# Patient Record
Sex: FEMALE | Race: WHITE | URBAN - METROPOLITAN AREA
[De-identification: names, ages, dates, MRNs, and addresses within clinical notes are randomized per-mention and may not be internally consistent; named-entity substitution may affect disease eponyms.]

---

## 2019-07-03 ENCOUNTER — NEW PATIENT (OUTPATIENT)
Dept: URBAN - METROPOLITAN AREA CLINIC 30 | Facility: CLINIC | Age: 84
End: 2019-07-03
Payer: MEDICARE

## 2019-07-03 DIAGNOSIS — Z98.890 OTHER SPECIFIED POSTPROCEDURAL STATES: ICD-10-CM

## 2019-07-03 DIAGNOSIS — H35.371 PUCKERING OF MACULA, RIGHT EYE: ICD-10-CM

## 2019-07-03 DIAGNOSIS — H16.223 KERATOCONJUNCTIVITIS SICCA, BILATERAL: ICD-10-CM

## 2019-07-03 DIAGNOSIS — H25.813 COMBINED FORMS OF AGE-RELATED CATARACT, BILATERAL: ICD-10-CM

## 2019-07-03 DIAGNOSIS — G51.0 BELL'S PALSY: Primary | ICD-10-CM

## 2019-07-03 PROCEDURE — 92004 COMPRE OPH EXAM NEW PT 1/>: CPT | Performed by: OPTOMETRIST

## 2019-07-03 PROCEDURE — 83861 MICROFLUID ANALY TEARS: CPT | Performed by: OPTOMETRIST

## 2019-07-03 PROCEDURE — 92134 CPTRZ OPH DX IMG PST SGM RTA: CPT | Performed by: OPTOMETRIST

## 2019-07-03 ASSESSMENT — INTRAOCULAR PRESSURE
OD: 11
OS: 10

## 2019-07-03 ASSESSMENT — VISUAL ACUITY
OD: 20/50
OS: 20/40

## 2019-07-03 ASSESSMENT — KERATOMETRY
OS: 42.30
OD: 23.04

## 2019-08-14 ENCOUNTER — FOLLOW UP ESTABLISHED (OUTPATIENT)
Dept: URBAN - METROPOLITAN AREA CLINIC 30 | Facility: CLINIC | Age: 84
End: 2019-08-14
Payer: MEDICARE

## 2019-08-14 PROCEDURE — 99213 OFFICE O/P EST LOW 20 MIN: CPT | Performed by: OPTOMETRIST

## 2019-08-14 PROCEDURE — 92015 DETERMINE REFRACTIVE STATE: CPT | Performed by: OPTOMETRIST

## 2019-08-14 ASSESSMENT — INTRAOCULAR PRESSURE
OD: 11
OS: 11

## 2020-08-26 ENCOUNTER — NEW PATIENT (OUTPATIENT)
Dept: URBAN - METROPOLITAN AREA CLINIC 24 | Facility: CLINIC | Age: 85
End: 2020-08-26
Payer: MEDICARE

## 2020-08-26 DIAGNOSIS — H16.143 PUNCTATE KERATITIS, BILATERAL: Primary | ICD-10-CM

## 2020-08-26 DIAGNOSIS — H25.813 COMBINED FORMS OF AGE-RELATED CATARACT, BILATERAL: ICD-10-CM

## 2020-08-26 DIAGNOSIS — H52.4 PRESBYOPIA: ICD-10-CM

## 2020-08-26 DIAGNOSIS — H33.311 HORSESHOE TEAR OF RETINA WITHOUT DETACHMENT, RIGHT EYE: ICD-10-CM

## 2020-08-26 PROCEDURE — 92004 COMPRE OPH EXAM NEW PT 1/>: CPT | Performed by: OPTOMETRIST

## 2020-08-26 ASSESSMENT — VISUAL ACUITY
OS: 20/30
OD: 20/30

## 2020-08-26 ASSESSMENT — INTRAOCULAR PRESSURE
OS: 12
OD: 11

## 2020-08-26 ASSESSMENT — KERATOMETRY
OD: 43.06
OS: 42.70

## 2022-12-12 ENCOUNTER — OFFICE VISIT (OUTPATIENT)
Dept: URBAN - METROPOLITAN AREA CLINIC 24 | Facility: CLINIC | Age: 87
End: 2022-12-12
Payer: MEDICARE

## 2022-12-12 DIAGNOSIS — H16.143 PUNCTATE KERATITIS, BILATERAL: ICD-10-CM

## 2022-12-12 DIAGNOSIS — H33.311 HORSESHOE TEAR OF RETINA WITHOUT DETACHMENT, RIGHT EYE: ICD-10-CM

## 2022-12-12 DIAGNOSIS — H25.813 COMBINED FORMS OF AGE-RELATED CATARACT, BILATERAL: Primary | ICD-10-CM

## 2022-12-12 DIAGNOSIS — G51.0 BELL'S PALSY: ICD-10-CM

## 2022-12-12 DIAGNOSIS — H00.11 CHALAZION RIGHT UPPER LID: ICD-10-CM

## 2022-12-12 PROCEDURE — 92250 FUNDUS PHOTOGRAPHY W/I&R: CPT | Performed by: OPTOMETRIST

## 2022-12-12 PROCEDURE — 92134 CPTRZ OPH DX IMG PST SGM RTA: CPT | Performed by: OPTOMETRIST

## 2022-12-12 PROCEDURE — 99214 OFFICE O/P EST MOD 30 MIN: CPT | Performed by: OPTOMETRIST

## 2022-12-12 ASSESSMENT — VISUAL ACUITY
OD: 20/50
OS: 20/30

## 2022-12-12 ASSESSMENT — INTRAOCULAR PRESSURE
OS: 12
OD: 12

## 2022-12-12 NOTE — IMPRESSION/PLAN
Impression: Bell's palsy: G51.0. Plan: Longstanding, s/p gold weight UL. Incomplete lid closure with 3+ PEK. Recommend gel/letitia tears QID+ May tape lids closed at night. Discussed diagnosis in will limit BCVA s/p cat sx. [place] for pre-op.

## 2022-12-12 NOTE — IMPRESSION/PLAN
Impression: Chalazion right upper lid: H00.11. Plan: Improving. Continue warm compresses BID/lid hygiene OU. Discussed option of referral for removal, pt defers at this time.

## 2022-12-12 NOTE — IMPRESSION/PLAN
Impression: Combined forms of age-related cataract, bilateral: H25.813. Plan: **[place] FOR PRE-OP** Discussed cataract diagnosis with the patient. Discussed and reviewed treatment options for cataracts. Risks and benefits of surgical treatment were discussed and understood. Patient elects surgical treatment. Recommend surgery OU, OD first. Patient is candidate/interested in multifocal, toric, standard, LenSx and ORA. Aim OD: plano. Aim OS: plano. Patient understands may need glasses for full time wear. Patient understands will need glasses for all near work, including computer.

## 2022-12-12 NOTE — IMPRESSION/PLAN
Impression: Horseshoe tear of retina without detachment, right eye Plan: s/p laser repair. No new tears/hole/detachments 360 OU. Si/sx of RD reviewed. Pt to RTC immediately if any experienced. Understands limited BCVA s/p cat sx.

## 2022-12-12 NOTE — IMPRESSION/PLAN
Impression: Punctate keratitis, bilateral with MGD Plan: Explained condition to patient, visually significant OD>OS. Recommend warm compresses, lid scrubs (recommend VISTA cleansing foam), and artificial tears QID or more. Will monitor patient for now. RTC if no improvement or worsens. Understands CHIOMA symptoms can enhance s/p cat sx.

## 2023-02-10 ENCOUNTER — ADULT PHYSICAL (OUTPATIENT)
Dept: URBAN - METROPOLITAN AREA CLINIC 24 | Facility: CLINIC | Age: 88
End: 2023-02-10
Payer: MEDICARE

## 2023-02-10 DIAGNOSIS — Z01.818 ENCOUNTER FOR OTHER PREPROCEDURAL EXAMINATION: Primary | ICD-10-CM

## 2023-02-10 DIAGNOSIS — H25.813 COMBINED FORMS OF AGE-RELATED CATARACT, BILATERAL: ICD-10-CM

## 2023-02-10 PROCEDURE — 99203 OFFICE O/P NEW LOW 30 MIN: CPT | Performed by: REGISTERED NURSE

## 2023-02-10 ASSESSMENT — PACHYMETRY
OS: 25.03
OD: 3.16
OD: 24.86
OS: 3.30

## 2023-02-16 ENCOUNTER — PRE-OPERATIVE VISIT (OUTPATIENT)
Dept: URBAN - METROPOLITAN AREA CLINIC 24 | Facility: CLINIC | Age: 88
End: 2023-02-16
Payer: MEDICARE

## 2023-02-16 DIAGNOSIS — H33.311 HORSESHOE TEAR OF RETINA WITHOUT DETACHMENT, RIGHT EYE: ICD-10-CM

## 2023-02-16 DIAGNOSIS — H16.143 PUNCTATE KERATITIS, BILATERAL: ICD-10-CM

## 2023-02-16 DIAGNOSIS — G51.0 BELL'S PALSY: ICD-10-CM

## 2023-02-16 DIAGNOSIS — H25.813 COMBINED FORMS OF AGE-RELATED CATARACT, BILATERAL: Primary | ICD-10-CM

## 2023-02-16 PROCEDURE — 99204 OFFICE O/P NEW MOD 45 MIN: CPT | Performed by: OPHTHALMOLOGY

## 2023-02-16 ASSESSMENT — INTRAOCULAR PRESSURE
OS: 12
OD: 11

## 2023-02-16 ASSESSMENT — VISUAL ACUITY
OD: 20/30
OS: 20/30

## 2023-02-16 NOTE — IMPRESSION/PLAN
Impression: Punctate keratitis, bilateral: H16.143. Plan: Cont PFATs ATC, gel qhs. PP placed BLL soft plug today. If problematic after cat sx, will treat more aggressively. CONSTITUTIONAL:  +fever  HEENT:  Eyes:  No visual loss, blurred vision, double vision or yellow sclerae. Ears, Nose, Throat:  No hearing loss, sneezing, congestion, runny nose or sore throat.  SKIN:  No rash or itching.  CARDIOVASCULAR:  No chest pain, chest pressure or chest discomfort. No palpitations.  RESPIRATORY:  No shortness of breath, cough or sputum.  GASTROINTESTINAL:  No anorexia, nausea, vomiting or diarrhea. No abdominal pain or blood.  GENITOURINARY:  Denies hematuria, dysuria.   NEUROLOGICAL:  No headache, dizziness, syncope, paralysis, ataxia, numbness or tingling in the extremities. No change in bowel or bladder control.  MUSCULOSKELETAL:  No muscle, back pain, joint pain or stiffness.  HEMATOLOGIC:  No bleeding or bruising.  LYMPHATICS:  No enlarged nodes.   PSYCHIATRIC:  No history of depression or anxiety.  ENDOCRINOLOGIC:  No reports of sweating, cold or heat intolerance. No polyuria or polydipsia.  ALLERGIES:  No history of asthma, hives, eczema or rhinitis.

## 2023-02-16 NOTE — IMPRESSION/PLAN
Impression: Combined forms of age-related cataract, bilateral: H25.813. Plan: Discussed cataract diagnosis with the patient. Risks and benefits of surgical treatment were discussed and understood. Patient elects surgical treatment. Patient is not a candidate for specialty IOLs given **[place] palsy, severe DED**. Pt understands that vision will be limited by previous ocular disease and will need to wear glasses/CTLs for their best vision. Patient desires surgery OU,  OD First.  Standard IOL OU,  AIM= Distance OU,  INJECTABLE OU(Trimoxi + Moxi given PP placed today.), NO LENSX, Declines ORA and AMP. s/p LASIK OU.   
pt understands the need of glasses after surgery and also limitations due to Lasik or RK ( 50/50 chance of needing additional surgery such as PRK, Lasik or other corneal surgeries) Piggy Back IOL, Removal and replacement, or possibly enhancement with more corneal refractive surgery.

## 2023-02-16 NOTE — IMPRESSION/PLAN
Impression: Bell's palsy: G51.0. Plan: Longstanding, s/p gold weight UL and removal; botox x 4. Cont PFATs ATC, gel qhs. PP placed BLL soft plug 0.5 May tape lids closed at night. Discussed diagnosis in will limit BCVA s/p cat sx.

## 2023-02-16 NOTE — IMPRESSION/PLAN
Impression: Horseshoe tear of retina without detachment, right eye Plan: s/p laser repair. No new tears/hole/detachments 360 OU. Si/sx of RD reviewed. Pt to RTC immediately if any experienced.

## 2023-02-23 ENCOUNTER — SURGERY (OUTPATIENT)
Dept: URBAN - METROPOLITAN AREA SURGERY 12 | Facility: SURGERY | Age: 88
End: 2023-02-23
Payer: MEDICARE

## 2023-02-23 DIAGNOSIS — H25.813 COMBINED FORMS OF AGE-RELATED CATARACT, BILATERAL: Primary | ICD-10-CM

## 2023-02-23 PROCEDURE — 66984 XCAPSL CTRC RMVL W/O ECP: CPT | Performed by: OPHTHALMOLOGY

## 2023-02-23 RX ORDER — MOXIFLOXACIN 5 MG/ML
0.5 % SOLUTION/ DROPS OPHTHALMIC
Qty: 5 | Refills: 1 | Status: ACTIVE
Start: 2023-02-23

## 2023-02-24 ENCOUNTER — POST-OPERATIVE VISIT (OUTPATIENT)
Dept: URBAN - METROPOLITAN AREA CLINIC 24 | Facility: CLINIC | Age: 88
End: 2023-02-24
Payer: COMMERCIAL

## 2023-02-24 DIAGNOSIS — Z48.810 ENCOUNTER FOR SURGICAL AFTERCARE FOLLOWING SURGERY ON A SENSE ORGAN: Primary | ICD-10-CM

## 2023-02-24 PROCEDURE — 99024 POSTOP FOLLOW-UP VISIT: CPT | Performed by: OPTOMETRIST

## 2023-02-24 ASSESSMENT — INTRAOCULAR PRESSURE: OD: 5

## 2023-03-03 ENCOUNTER — POST-OPERATIVE VISIT (OUTPATIENT)
Dept: URBAN - METROPOLITAN AREA CLINIC 24 | Facility: CLINIC | Age: 88
End: 2023-03-03
Payer: MEDICARE

## 2023-03-03 DIAGNOSIS — Z48.810 ENCOUNTER FOR SURGICAL AFTERCARE FOLLOWING SURGERY ON A SENSE ORGAN: Primary | ICD-10-CM

## 2023-03-03 PROCEDURE — 99024 POSTOP FOLLOW-UP VISIT: CPT | Performed by: STUDENT IN AN ORGANIZED HEALTH CARE EDUCATION/TRAINING PROGRAM

## 2023-03-03 ASSESSMENT — VISUAL ACUITY
OD: 20/30
OS: 20/30

## 2023-03-03 ASSESSMENT — INTRAOCULAR PRESSURE
OS: 12
OD: 8

## 2023-03-03 NOTE — IMPRESSION/PLAN
Impression: S/P Cataract Extraction by phacoemulsification with IOL placement OD - 8 Days. Encounter for surgical aftercare following surgery on a sense organ  Z48.810. 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 
Pt ed vision good but limited d/t history, pt appreciates improvements in vision and ready to proceed with cat sx OS

## 2023-03-09 ENCOUNTER — SURGERY (OUTPATIENT)
Dept: URBAN - METROPOLITAN AREA SURGERY 12 | Facility: SURGERY | Age: 88
End: 2023-03-09
Payer: MEDICARE

## 2023-03-09 DIAGNOSIS — H25.812 COMBINED FORMS OF AGE-RELATED CATARACT, LEFT EYE: Primary | ICD-10-CM

## 2023-03-09 PROCEDURE — 66984 XCAPSL CTRC RMVL W/O ECP: CPT | Performed by: OPHTHALMOLOGY

## 2023-03-10 ENCOUNTER — POST-OPERATIVE VISIT (OUTPATIENT)
Dept: URBAN - METROPOLITAN AREA CLINIC 24 | Facility: CLINIC | Age: 88
End: 2023-03-10
Payer: MEDICARE

## 2023-03-10 DIAGNOSIS — Z96.1 PRESENCE OF INTRAOCULAR LENS: Primary | ICD-10-CM

## 2023-03-10 PROCEDURE — 99024 POSTOP FOLLOW-UP VISIT: CPT | Performed by: OPTOMETRIST

## 2023-03-10 ASSESSMENT — INTRAOCULAR PRESSURE: OS: 14

## 2023-04-10 ENCOUNTER — POST-OPERATIVE VISIT (OUTPATIENT)
Dept: URBAN - METROPOLITAN AREA CLINIC 24 | Facility: CLINIC | Age: 88
End: 2023-04-10
Payer: MEDICARE

## 2023-04-10 DIAGNOSIS — H52.4 PRESBYOPIA: ICD-10-CM

## 2023-04-10 DIAGNOSIS — Z48.810 ENCOUNTER FOR SURGICAL AFTERCARE FOLLOWING SURGERY ON A SENSE ORGAN: Primary | ICD-10-CM

## 2023-04-10 PROCEDURE — 99024 POSTOP FOLLOW-UP VISIT: CPT | Performed by: OPTOMETRIST

## 2023-04-10 ASSESSMENT — KERATOMETRY
OS: 42.73
OD: 42.99

## 2023-04-10 ASSESSMENT — INTRAOCULAR PRESSURE
OD: 14
OS: 14

## 2023-04-10 ASSESSMENT — VISUAL ACUITY
OD: 20/30
OS: 20/25

## 2023-04-10 NOTE — IMPRESSION/PLAN
Impression: S/P Cataract Extraction by phacoemulsification with IOL placement OS - 32 Days. Encounter for surgical aftercare following surgery on a sense organ  Z48.810. Plan: Patient happy with outcome of cat sx OU. 
Srx released per pt request.

## 2023-12-18 ENCOUNTER — OFFICE VISIT (OUTPATIENT)
Dept: URBAN - METROPOLITAN AREA CLINIC 24 | Facility: CLINIC | Age: 88
End: 2023-12-18
Payer: MEDICARE

## 2023-12-18 DIAGNOSIS — H26.493 OTHER SECONDARY CATARACT, BILATERAL: ICD-10-CM

## 2023-12-18 DIAGNOSIS — G51.0 BELL'S PALSY: ICD-10-CM

## 2023-12-18 DIAGNOSIS — H33.311 HORSESHOE TEAR OF RETINA WITHOUT DETACHMENT, RIGHT EYE: Primary | ICD-10-CM

## 2023-12-18 PROCEDURE — 92134 CPTRZ OPH DX IMG PST SGM RTA: CPT | Performed by: STUDENT IN AN ORGANIZED HEALTH CARE EDUCATION/TRAINING PROGRAM

## 2023-12-18 PROCEDURE — 92012 INTRM OPH EXAM EST PATIENT: CPT | Performed by: STUDENT IN AN ORGANIZED HEALTH CARE EDUCATION/TRAINING PROGRAM

## 2023-12-18 ASSESSMENT — VISUAL ACUITY
OD: 20/30
OS: 20/25

## 2023-12-18 ASSESSMENT — KERATOMETRY
OD: 42.63
OS: 42.83

## 2023-12-18 ASSESSMENT — INTRAOCULAR PRESSURE
OS: 14
OD: 13

## 2024-12-23 ENCOUNTER — OFFICE VISIT (OUTPATIENT)
Dept: URBAN - METROPOLITAN AREA CLINIC 24 | Facility: CLINIC | Age: 89
End: 2024-12-23
Payer: MEDICARE

## 2024-12-23 DIAGNOSIS — H33.311 HORSESHOE TEAR OF RETINA WITHOUT DETACHMENT, RIGHT EYE: Primary | ICD-10-CM

## 2024-12-23 DIAGNOSIS — G51.0 BELL'S PALSY: ICD-10-CM

## 2024-12-23 PROCEDURE — 99214 OFFICE O/P EST MOD 30 MIN: CPT | Performed by: STUDENT IN AN ORGANIZED HEALTH CARE EDUCATION/TRAINING PROGRAM

## 2024-12-23 PROCEDURE — 92134 CPTRZ OPH DX IMG PST SGM RTA: CPT | Performed by: STUDENT IN AN ORGANIZED HEALTH CARE EDUCATION/TRAINING PROGRAM

## 2024-12-23 RX ORDER — CYCLOSPORINE 0.5 MG/ML
0.05 % EMULSION OPHTHALMIC
Qty: 180 | Refills: 3 | Status: INACTIVE
Start: 2024-12-23 | End: 2025-03-22

## 2024-12-23 ASSESSMENT — VISUAL ACUITY
OS: 20/25
OD: 20/50

## 2024-12-23 ASSESSMENT — KERATOMETRY
OD: 43.05
OS: 42.88

## 2024-12-23 ASSESSMENT — INTRAOCULAR PRESSURE
OS: 13
OD: 13

## 2025-03-24 ENCOUNTER — OFFICE VISIT (OUTPATIENT)
Dept: URBAN - METROPOLITAN AREA CLINIC 24 | Facility: CLINIC | Age: OVER 89
End: 2025-03-24
Payer: MEDICARE

## 2025-03-24 DIAGNOSIS — G51.0 BELL'S PALSY: Primary | ICD-10-CM

## 2025-03-24 PROCEDURE — 99213 OFFICE O/P EST LOW 20 MIN: CPT | Performed by: STUDENT IN AN ORGANIZED HEALTH CARE EDUCATION/TRAINING PROGRAM

## 2025-03-24 ASSESSMENT — INTRAOCULAR PRESSURE
OS: 14
OD: 16

## 2025-06-25 ENCOUNTER — OFFICE VISIT (OUTPATIENT)
Dept: URBAN - METROPOLITAN AREA CLINIC 24 | Facility: CLINIC | Age: OVER 89
End: 2025-06-25
Payer: MEDICARE

## 2025-06-25 DIAGNOSIS — G51.0 BELL'S PALSY: Primary | ICD-10-CM

## 2025-06-25 DIAGNOSIS — H16.143 PUNCTATE KERATITIS, BILATERAL: ICD-10-CM

## 2025-06-25 PROCEDURE — 99213 OFFICE O/P EST LOW 20 MIN: CPT | Performed by: STUDENT IN AN ORGANIZED HEALTH CARE EDUCATION/TRAINING PROGRAM

## 2025-06-25 ASSESSMENT — KERATOMETRY
OS: 43.00
OD: 42.88

## 2025-06-25 ASSESSMENT — INTRAOCULAR PRESSURE
OS: 15
OD: 16

## 2025-06-25 ASSESSMENT — VISUAL ACUITY: OS: 20/25
